# Patient Record
Sex: MALE | Race: WHITE | NOT HISPANIC OR LATINO | URBAN - METROPOLITAN AREA
[De-identification: names, ages, dates, MRNs, and addresses within clinical notes are randomized per-mention and may not be internally consistent; named-entity substitution may affect disease eponyms.]

---

## 2021-04-29 ENCOUNTER — LAB OUTSIDE AN ENCOUNTER (OUTPATIENT)
Dept: URBAN - METROPOLITAN AREA CLINIC 74 | Facility: CLINIC | Age: 66
End: 2021-04-29

## 2021-04-29 ENCOUNTER — OFFICE VISIT (OUTPATIENT)
Dept: URBAN - METROPOLITAN AREA CLINIC 74 | Facility: CLINIC | Age: 66
End: 2021-04-29
Payer: MEDICARE

## 2021-04-29 VITALS
HEART RATE: 70 BPM | BODY MASS INDEX: 29.71 KG/M2 | DIASTOLIC BLOOD PRESSURE: 88 MMHG | WEIGHT: 200.6 LBS | SYSTOLIC BLOOD PRESSURE: 140 MMHG | HEIGHT: 69 IN | TEMPERATURE: 98 F

## 2021-04-29 DIAGNOSIS — K64.5 THROMBOSED EXTERNAL HEMORRHOIDS: ICD-10-CM

## 2021-04-29 DIAGNOSIS — K62.5 RECTAL BLEEDING: ICD-10-CM

## 2021-04-29 DIAGNOSIS — Z86.010 PERSONAL HISTORY OF COLONIC POLYPS: ICD-10-CM

## 2021-04-29 PROCEDURE — 46221 LIGATION OF HEMORRHOID(S): CPT | Performed by: INTERNAL MEDICINE

## 2021-04-29 PROCEDURE — 99203 OFFICE O/P NEW LOW 30 MIN: CPT | Performed by: INTERNAL MEDICINE

## 2021-04-29 RX ORDER — KRILL/OM-3/DHA/EPA/PHOSPHO/AST 1000-230MG
1 TABLET CAPSULE ORAL ONCE A DAY
Status: ACTIVE | COMMUNITY

## 2021-04-29 RX ORDER — NICOTINE POLACRILEX 2 MG
AS DIRECTED LOZENGE BUCCAL
Status: ACTIVE | COMMUNITY

## 2021-04-29 RX ORDER — LISINOPRIL 10 MG/1
1 TABLET TABLET ORAL ONCE A DAY
Status: ACTIVE | COMMUNITY

## 2021-04-29 RX ORDER — HYDROCORTISONE ACETATE PRAMOXINE HCL 2.5; 1 G/100G; G/100G
1 APPLICATION CREAM TOPICAL THREE TIMES A DAY
Status: ACTIVE | COMMUNITY

## 2021-04-29 NOTE — HPI-TODAY'S VISIT:
67 yo CM here for hemorrhoid banding. had colonoscopy last Oct. two small polyps/internal hemorrhoids. has been having rectal pain/rectal bleeding lately

## 2021-04-29 NOTE — PHYSICAL EXAM GASTROINTESTINAL
Abdomen , soft, nontender, nondistended , no guarding or rigidity , no masses palpable , normal bowel sounds , Liver and Spleen , no hepatomegaly present , no hepatosplenomegaly , liver nontender , spleen not palpable , Rectal ,thrombosed external hemorrhoid/skin tags

## 2021-05-20 ENCOUNTER — WEB ENCOUNTER (OUTPATIENT)
Dept: URBAN - METROPOLITAN AREA CLINIC 74 | Facility: CLINIC | Age: 66
End: 2021-05-20

## 2021-05-20 ENCOUNTER — OFFICE VISIT (OUTPATIENT)
Dept: URBAN - METROPOLITAN AREA CLINIC 74 | Facility: CLINIC | Age: 66
End: 2021-05-20
Payer: MEDICARE

## 2021-05-20 ENCOUNTER — LAB OUTSIDE AN ENCOUNTER (OUTPATIENT)
Dept: URBAN - METROPOLITAN AREA CLINIC 74 | Facility: CLINIC | Age: 66
End: 2021-05-20

## 2021-05-20 DIAGNOSIS — K62.5 RECTAL BLEEDING: ICD-10-CM

## 2021-05-20 DIAGNOSIS — K64.5 THROMBOSED EXTERNAL HEMORRHOIDS: ICD-10-CM

## 2021-05-20 PROCEDURE — 46221 LIGATION OF HEMORRHOID(S): CPT | Performed by: INTERNAL MEDICINE

## 2021-05-20 RX ORDER — NICOTINE POLACRILEX 2 MG
AS DIRECTED LOZENGE BUCCAL
Status: ACTIVE | COMMUNITY

## 2021-05-20 RX ORDER — LISINOPRIL 10 MG/1
1 TABLET TABLET ORAL ONCE A DAY
Status: ACTIVE | COMMUNITY

## 2021-05-20 RX ORDER — KRILL/OM-3/DHA/EPA/PHOSPHO/AST 1000-230MG
1 TABLET CAPSULE ORAL ONCE A DAY
Status: ACTIVE | COMMUNITY

## 2021-05-20 RX ORDER — HYDROCORTISONE ACETATE PRAMOXINE HCL 2.5; 1 G/100G; G/100G
1 APPLICATION CREAM TOPICAL THREE TIMES A DAY
Status: ACTIVE | COMMUNITY

## 2021-05-20 NOTE — HPI-TODAY'S VISIT:
67 yo CM here for hemorrhoid banding. had colonoscopy last Oct. two small polyps/internal hemorrhoids. has been having rectal pain. getting better though

## 2021-06-07 ENCOUNTER — LAB OUTSIDE AN ENCOUNTER (OUTPATIENT)
Dept: URBAN - METROPOLITAN AREA CLINIC 74 | Facility: CLINIC | Age: 66
End: 2021-06-07

## 2021-06-07 ENCOUNTER — OFFICE VISIT (OUTPATIENT)
Dept: URBAN - METROPOLITAN AREA CLINIC 74 | Facility: CLINIC | Age: 66
End: 2021-06-07
Payer: MEDICARE

## 2021-06-07 DIAGNOSIS — K64.9 HEMORRHOIDS WITHOUT COMPLICATION: ICD-10-CM

## 2021-06-07 DIAGNOSIS — K64.5 THROMBOSED EXTERNAL HEMORRHOIDS: ICD-10-CM

## 2021-06-07 DIAGNOSIS — K62.5 RECTAL BLEEDING: ICD-10-CM

## 2021-06-07 DIAGNOSIS — Z86.010 PERSONAL HISTORY OF COLONIC POLYPS: ICD-10-CM

## 2021-06-07 DIAGNOSIS — K64.0 FIRST DEGREE HEMORRHOIDS: ICD-10-CM

## 2021-06-07 PROCEDURE — 46221 LIGATION OF HEMORRHOID(S): CPT | Performed by: INTERNAL MEDICINE

## 2021-06-07 RX ORDER — HYDROCORTISONE ACETATE PRAMOXINE HCL 2.5; 1 G/100G; G/100G
1 APPLICATION CREAM TOPICAL THREE TIMES A DAY
Status: ACTIVE | COMMUNITY

## 2021-06-07 RX ORDER — KRILL/OM-3/DHA/EPA/PHOSPHO/AST 1000-230MG
1 TABLET CAPSULE ORAL ONCE A DAY
Status: ACTIVE | COMMUNITY

## 2021-06-07 RX ORDER — LISINOPRIL 10 MG/1
1 TABLET TABLET ORAL ONCE A DAY
Status: ACTIVE | COMMUNITY

## 2021-06-07 RX ORDER — NICOTINE POLACRILEX 2 MG
AS DIRECTED LOZENGE BUCCAL
Status: ACTIVE | COMMUNITY

## 2021-06-07 NOTE — HPI-TODAY'S VISIT:
65 yo CM here for hemorrhoid banding. had colonoscopy last Oct. two small polyps/internal hemorrhoids. has been having rectal pain. getting better though. s/p one session of banding. thrombosed hemorrhoids have resolved

## 2021-06-25 ENCOUNTER — OFFICE VISIT (OUTPATIENT)
Dept: URBAN - METROPOLITAN AREA CLINIC 40 | Facility: CLINIC | Age: 66
End: 2021-06-25
Payer: MEDICARE

## 2021-06-25 DIAGNOSIS — K64.9 HEMORRHOIDS WITHOUT COMPLICATION: ICD-10-CM

## 2021-06-25 DIAGNOSIS — K64.8 BLEEDING INTERNAL HEMORRHOIDS: ICD-10-CM

## 2021-06-25 DIAGNOSIS — Z86.010 PERSONAL HISTORY OF COLONIC POLYPS: ICD-10-CM

## 2021-06-25 DIAGNOSIS — K64.5 THROMBOSED EXTERNAL HEMORRHOIDS: ICD-10-CM

## 2021-06-25 DIAGNOSIS — K62.5 RECTAL BLEEDING: ICD-10-CM

## 2021-06-25 DIAGNOSIS — K64.0 BLEEDING GRADE I HEMORRHOIDS: ICD-10-CM

## 2021-06-25 PROBLEM — 12063002: Status: ACTIVE | Noted: 2021-04-29

## 2021-06-25 PROCEDURE — 46221 LIGATION OF HEMORRHOID(S): CPT | Performed by: INTERNAL MEDICINE

## 2021-06-25 RX ORDER — KRILL/OM-3/DHA/EPA/PHOSPHO/AST 1000-230MG
1 TABLET CAPSULE ORAL ONCE A DAY
Status: ACTIVE | COMMUNITY

## 2021-06-25 RX ORDER — NICOTINE POLACRILEX 2 MG
AS DIRECTED LOZENGE BUCCAL
Status: ACTIVE | COMMUNITY

## 2021-06-25 RX ORDER — HYDROCORTISONE ACETATE PRAMOXINE HCL 2.5; 1 G/100G; G/100G
1 APPLICATION CREAM TOPICAL THREE TIMES A DAY
Status: ACTIVE | COMMUNITY

## 2021-06-25 RX ORDER — LISINOPRIL 10 MG/1
1 TABLET TABLET ORAL ONCE A DAY
Status: ACTIVE | COMMUNITY

## 2021-06-25 NOTE — HPI-TODAY'S VISIT:
67 yo CM here for hemorrhoid banding. had colonoscopy last Oct. two small polyps/internal hemorrhoids. has been having rectal pain. getting better though. s/p two session of banding. thrombosed hemorrhoids have resolved

## 2021-07-08 ENCOUNTER — DASHBOARD ENCOUNTERS (OUTPATIENT)
Age: 66
End: 2021-07-08

## 2021-07-08 ENCOUNTER — OFFICE VISIT (OUTPATIENT)
Dept: URBAN - METROPOLITAN AREA CLINIC 74 | Facility: CLINIC | Age: 66
End: 2021-07-08
Payer: MEDICARE

## 2021-07-08 VITALS
HEART RATE: 79 BPM | TEMPERATURE: 97.5 F | SYSTOLIC BLOOD PRESSURE: 140 MMHG | WEIGHT: 207.2 LBS | HEIGHT: 69 IN | OXYGEN SATURATION: 98 % | DIASTOLIC BLOOD PRESSURE: 80 MMHG | BODY MASS INDEX: 30.69 KG/M2

## 2021-07-08 DIAGNOSIS — K62.89 ANAL PAIN: ICD-10-CM

## 2021-07-08 DIAGNOSIS — Z86.010 PERSONAL HISTORY OF COLONIC POLYPS: ICD-10-CM

## 2021-07-08 DIAGNOSIS — Z87.19 HISTORY OF HEMORRHOIDS: ICD-10-CM

## 2021-07-08 DIAGNOSIS — Z85.46 HISTORY OF PROSTATE CANCER: ICD-10-CM

## 2021-07-08 PROBLEM — 16851191000119103: Status: ACTIVE | Noted: 2021-07-08

## 2021-07-08 PROBLEM — 428283002: Status: ACTIVE | Noted: 2021-04-29

## 2021-07-08 PROBLEM — 428262008: Status: ACTIVE | Noted: 2021-07-08

## 2021-07-08 PROCEDURE — 99213 OFFICE O/P EST LOW 20 MIN: CPT | Performed by: INTERNAL MEDICINE

## 2021-07-08 RX ORDER — KRILL/OM-3/DHA/EPA/PHOSPHO/AST 1000-230MG
1 TABLET CAPSULE ORAL ONCE A DAY
Status: ACTIVE | COMMUNITY

## 2021-07-08 RX ORDER — HYDROCORTISONE ACETATE PRAMOXINE HCL 2.5; 1 G/100G; G/100G
1 APPLICATION CREAM TOPICAL THREE TIMES A DAY
Status: DISCONTINUED | COMMUNITY

## 2021-07-08 RX ORDER — NICOTINE POLACRILEX 2 MG
AS DIRECTED LOZENGE BUCCAL
Status: DISCONTINUED | COMMUNITY

## 2021-07-08 RX ORDER — LISINOPRIL 10 MG/1
1 TABLET TABLET ORAL ONCE A DAY
Status: ACTIVE | COMMUNITY

## 2021-07-08 NOTE — HPI-TODAY'S VISIT:
Today July 8, 2021 the patient returns for a follow-up visit, the patient was last seen in the office by Dr. Bravo on June 25, 2021 with a thrombosed external hemorrhoid, rectal bleeding, personal history of colonic polyps.  The patient underwent hemorrhoidal banding and was treated with nifedipine\hydrocortisone.  The patient was advised to get a colonoscopy in 4 years.  Today the patient returns to the office stating that he did complete hemorrhoidal banding about 2 weeks ago and he continues to have intermittent anal discomfort including itching, burning and pain.  The patient has used the ointment as prescribed by Dr. Bravo, it works temporarily, there are days that he is better but then symptoms recur.  The patient will be recommended to be seen by Dr. Franklin Bustillos, colorectal surgeon for a second opinion and treatment.